# Patient Record
Sex: MALE | Race: WHITE | Employment: FULL TIME | URBAN - METROPOLITAN AREA
[De-identification: names, ages, dates, MRNs, and addresses within clinical notes are randomized per-mention and may not be internally consistent; named-entity substitution may affect disease eponyms.]

---

## 2017-03-05 ENCOUNTER — HOSPITAL ENCOUNTER (OUTPATIENT)
Age: 22
Discharge: HOME | End: 2017-03-05
Attending: EMERGENCY MEDICINE
Payer: COMMERCIAL

## 2017-03-05 VITALS
HEART RATE: 90 BPM | BODY MASS INDEX: 25.57 KG/M2 | TEMPERATURE: 99 F | RESPIRATION RATE: 20 BRPM | SYSTOLIC BLOOD PRESSURE: 110 MMHG | DIASTOLIC BLOOD PRESSURE: 75 MMHG | HEIGHT: 76 IN | OXYGEN SATURATION: 97 % | WEIGHT: 210 LBS

## 2017-03-05 DIAGNOSIS — J11.1 INFLUENZA: Primary | ICD-10-CM

## 2017-03-05 LAB — S PYO AG THROAT QL: NEGATIVE

## 2017-03-05 PROCEDURE — 99214 OFFICE O/P EST MOD 30 MIN: CPT

## 2017-03-05 PROCEDURE — 87430 STREP A AG IA: CPT

## 2017-03-05 PROCEDURE — 99213 OFFICE O/P EST LOW 20 MIN: CPT

## 2017-03-05 RX ORDER — IBUPROFEN 600 MG/1
600 TABLET ORAL EVERY 6 HOURS PRN
COMMUNITY
End: 2019-12-05 | Stop reason: ALTCHOICE

## 2017-03-05 RX ORDER — OSELTAMIVIR PHOSPHATE 75 MG/1
75 CAPSULE ORAL 2 TIMES DAILY
Qty: 10 CAPSULE | Refills: 0 | Status: SHIPPED | OUTPATIENT
Start: 2017-03-05 | End: 2017-03-10

## 2017-03-05 RX ORDER — BENZONATATE 200 MG/1
200 CAPSULE ORAL 3 TIMES DAILY PRN
Qty: 15 CAPSULE | Refills: 0 | Status: SHIPPED | OUTPATIENT
Start: 2017-03-05 | End: 2017-03-10

## 2017-03-05 RX ORDER — ACETAMINOPHEN 500 MG
1000 TABLET ORAL ONCE
Status: COMPLETED | OUTPATIENT
Start: 2017-03-05 | End: 2017-03-05

## 2017-03-05 NOTE — ED PROVIDER NOTES
Patient Seen in: 5 [facility]    History   Patient presents with:  Sore Throat  Cough/URI  Fever  Nausea/Vomiting/Diarrhea (gastrointestinal)    Stated Complaint: COUGH, SORE THROAT    HPI    2 nights ago patient had a scra °F (37.1 °C)   Temp src 03/05/17 1055 Oral   SpO2 03/05/17 1049 97 %   O2 Device 03/05/17 1049 None (Room air)       Current:/75 mmHg  Pulse 90  Temp(Src) 98.7 °F (37.1 °C) (Oral)  Resp 20  Ht 193 cm (6' 4\")  Wt 95.255 kg  BMI 25.57 kg/m2  SpO2 97% MD JOCELYNE  [facility]  [phone]    Schedule an appointment as soon as possible for a visit in 1 week  Follow up with your doctor is important, For follow-up      Medications Prescribed:  Current Discharge Medication List

## 2019-01-21 ENCOUNTER — APPOINTMENT (OUTPATIENT)
Dept: GENERAL RADIOLOGY | Age: 24
End: 2019-01-21
Attending: NURSE PRACTITIONER
Payer: COMMERCIAL

## 2019-01-21 ENCOUNTER — HOSPITAL ENCOUNTER (OUTPATIENT)
Age: 24
Discharge: HOME | End: 2019-01-21
Payer: COMMERCIAL

## 2019-01-21 VITALS
HEART RATE: 63 BPM | BODY MASS INDEX: 27.4 KG/M2 | DIASTOLIC BLOOD PRESSURE: 69 MMHG | TEMPERATURE: 99 F | WEIGHT: 225 LBS | HEIGHT: 76 IN | SYSTOLIC BLOOD PRESSURE: 125 MMHG | OXYGEN SATURATION: 96 % | RESPIRATION RATE: 18 BRPM

## 2019-01-21 DIAGNOSIS — S43.102D SEPARATION OF LEFT ACROMIOCLAVICULAR JOINT, SUBSEQUENT ENCOUNTER: ICD-10-CM

## 2019-01-21 DIAGNOSIS — S43.402D SPRAIN OF LEFT SHOULDER, UNSPECIFIED SHOULDER SPRAIN TYPE, SUBSEQUENT ENCOUNTER: Primary | ICD-10-CM

## 2019-01-21 PROCEDURE — 99213 OFFICE O/P EST LOW 20 MIN: CPT

## 2019-01-21 PROCEDURE — 73030 X-RAY EXAM OF SHOULDER: CPT | Performed by: NURSE PRACTITIONER

## 2019-01-21 PROCEDURE — 99214 OFFICE O/P EST MOD 30 MIN: CPT

## 2019-01-21 PROCEDURE — 73000 X-RAY EXAM OF COLLAR BONE: CPT | Performed by: NURSE PRACTITIONER

## 2019-01-21 NOTE — ED PROVIDER NOTES
Patient presents with:  Upper Extremity Injury (musculoskeletal)      HPI:     Tiffany Monique is a 30year old male who presents today with a chief complaint of pain in the shoulder and clavicle after a fall that occurred 2 days ago.   The patient states he pain, clavicle pain  All other systems reviewed and negative except as noted above. Constitutional and Vital Signs Reviewed. Physical Exam:     Findings:  EXTREMITIES: no cyanosis or edema.    Edema  No  Bruising:  No  Tendon function intact:  Yes  Skin i as discussed with at least a second degree and possibly a third degree joint a.c. separation. Correlate clinically and with bilateral a.c. joints with and without weights if needed.      Dictated by (CST): Niko George MD on 1/21/2019 at 12:49     Approve

## (undated) NOTE — ED AVS SNAPSHOT
[facility] AND [facility] in [address] [facility]  [address]    Phone:  [phone]    Fax:  Martin  98.   MRN: [id number]    Department:  [facility] AND [facility] in [address] [place] [address]   Date of Visit:  3/ Plenty of fluids and rest.  Ibuprofen for fevers. Please call on the next business morning to arrange your appointment for follow-up as instructed. Your diagnosis was made based on your presentation today.   Symptoms may change or worsen over time, o this physician (or your personal doctor if your instructions are to return to your personal doctor) about any new or lasting problems. The primary care or specialist physician may see patients referred from the [facility].  [address] Patricio  [address] [employer] ([address]) [facility] [employer] ([address]) [place] [address] ([place]) [phone]   Audrey Ibarra [address] [employer]  [facility] & medical emergencies, dial 911.

## (undated) NOTE — LETTER
Date & Time: 1/23/2019, 3:16 PM  Patient: Tanya Van JLQIDF  Encounter Provider(s):    JOSIAH Ramos       To Whom It May Concern:    Pili Delgadillo was seen and treated in our department on 1/21/2019.  He is able to drive without the use of the sling a